# Patient Record
Sex: FEMALE | Race: WHITE | Employment: STUDENT | URBAN - METROPOLITAN AREA
[De-identification: names, ages, dates, MRNs, and addresses within clinical notes are randomized per-mention and may not be internally consistent; named-entity substitution may affect disease eponyms.]

---
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
## 2019-12-19 ENCOUNTER — HOSPITAL ENCOUNTER (EMERGENCY)
Facility: CLINIC | Age: 19
Discharge: HOME | End: 2019-12-20
Attending: EMERGENCY MEDICINE | Admitting: EMERGENCY MEDICINE
Payer: COMMERCIAL

## 2019-12-19 VITALS
TEMPERATURE: 97.3 F | SYSTOLIC BLOOD PRESSURE: 130 MMHG | HEART RATE: 93 BPM | DIASTOLIC BLOOD PRESSURE: 85 MMHG | OXYGEN SATURATION: 100 % | RESPIRATION RATE: 16 BRPM

## 2019-12-19 DIAGNOSIS — T30.0 BURN, FIRST DEGREE: ICD-10-CM

## 2019-12-19 PROCEDURE — 16020 DRESS/DEBRID P-THICK BURN S: CPT

## 2019-12-19 PROCEDURE — 90471 IMMUNIZATION ADMIN: CPT

## 2019-12-19 PROCEDURE — 99283 EMERGENCY DEPT VISIT LOW MDM: CPT | Mod: 25

## 2019-12-19 RX ORDER — LIDOCAINE HYDROCHLORIDE 20 MG/ML
10 SOLUTION OROPHARYNGEAL ONCE
Status: COMPLETED | OUTPATIENT
Start: 2019-12-20 | End: 2019-12-20

## 2019-12-19 NOTE — ED AVS SNAPSHOT
[facility] Emergency Department  [address]  [facility] [address]  Phone:  [phone]  Fax:  [phone]                                    Dorina Sánchez   MRN: [id number]    Department:  [facility] Emergency Department   Date of Visit:  12/19/2019           After Visit Summary Signature Page    I have received my discharge instructions, and my questions have been answered. I have discussed any challenges I see with this plan with the nurse or doctor.    ..........................................................................................................................................  Patient/Patient Representative Signature      ..........................................................................................................................................  Patient Representative Print Name and Relationship to Patient    ..................................................               ................................................  Date                                   Time    ..........................................................................................................................................  Reviewed by Signature/Title    ...................................................              ..............................................  Date                                               Time          22EPIC Rev 08/18

## 2019-12-20 PROCEDURE — 25000125 ZZHC RX 250: Performed by: EMERGENCY MEDICINE

## 2019-12-20 PROCEDURE — 25000128 H RX IP 250 OP 636: Performed by: EMERGENCY MEDICINE

## 2019-12-20 PROCEDURE — 90471 IMMUNIZATION ADMIN: CPT

## 2019-12-20 PROCEDURE — 90715 TDAP VACCINE 7 YRS/> IM: CPT | Performed by: EMERGENCY MEDICINE

## 2019-12-20 RX ORDER — SILVER SULFADIAZINE 10 MG/G
CREAM TOPICAL ONCE
Status: COMPLETED | OUTPATIENT
Start: 2019-12-20 | End: 2019-12-20

## 2019-12-20 RX ADMIN — SILVER SULFADIAZINE: 10 CREAM TOPICAL at 01:03

## 2019-12-20 RX ADMIN — CLOSTRIDIUM TETANI TOXOID ANTIGEN (FORMALDEHYDE INACTIVATED), CORYNEBACTERIUM DIPHTHERIAE TOXOID ANTIGEN (FORMALDEHYDE INACTIVATED), BORDETELLA PERTUSSIS TOXOID ANTIGEN (GLUTARALDEHYDE INACTIVATED), BORDETELLA PERTUSSIS FILAMENTOUS HEMAGGLUTININ ANTIGEN (FORMALDEHYDE INACTIVATED), BORDETELLA PERTUSSIS PERTACTIN ANTIGEN, AND BORDETELLA PERTUSSIS FIMBRIAE 2/3 ANTIGEN 0.5 ML: 5; 2; 2.5; 5; 3; 5 INJECTION, SUSPENSION INTRAMUSCULAR at 00:06

## 2019-12-20 RX ADMIN — LIDOCAINE HYDROCHLORIDE 10 ML: 20 SOLUTION ORAL; TOPICAL at 00:05

## 2019-12-20 NOTE — ED PROVIDER NOTES
History     Chief Complaint:  Burn      HPI   Dorina Sánchez is a righthanded 19 year old female who presents with burn. The patient notes that she had been using eyebrow wax around an hour ago and that when she opened it it spilled over on her right arm and wrist. She reports that she had ibuprofen around an hour ago as well. They were concerned about infection as they are leaving for [place] tomorrow.    Allergies:  No known drug allergies     Medications:    Prednisone  Lidocaine 2 % gel  Aldactone  Nexplanon  Prozac    Past Medical History:    Anxiety   Factor 5 Leiden mutation  Ulcer Genital Female    Past Surgical History:    Tonsillectomy    Family History:    Factor 5 Leiden mutation    Social History:  Smoking status: Never smoker  Alcohol use: Yes, 2-3 times a week  Drug use: No  PCP: Tye Mendoza  Presents to the ED with father  Up to date on immunization, not up to date with tetanus  Marital Status:  Single [1]       Review of Systems   Skin:        Burn and redness to right wrist   All other systems reviewed and are negative.        Physical Exam   First Vitals:  BP: 130/85  Pulse: 93  Temp: 97.3  F (36.3  C)  Resp: 16  SpO2: 100 %      Physical Exam  General:  No respiratory distress    Cardiovascular: Good cap refill.    Respiratory: Breathing non labored.     Musculoskeletal: No tenderness. No bony deformity.     Skin: No rashes or petechiae. Redness and blistering to right wrist and forearm with some wax.    Neurologic: non focal     Psychiatric: Appropriate.       Emergency Department Course     Procedures    Interventions:  0005 Lidocaine 2 %, 10 mL  0006 Tdap, 0.5 mL, IM     Emergency Department Course:   Nursing notes and vitals reviewed.    2346 I performed an exam of the patient as documented above.     0055 I personally reviewed the results with the patient and answered all related questions prior to discharge.    Impression & Plan      Medical Decision Making:  Dorina Sánchez is a  19 year old female who presents to the emergency department today for evaluation of a burn from wax to her right arm.  The patient reports that she had heated the wax which is used to remove hair from her eyebrows 5 times more than usual.  When she pulled the lid off and burned on her right hand prior to arrival.  There is a partial-thickness burn with some blistering most of the blisters have already popped however.  I did find some wax still present and attempted to remove this after placing viscous lidocaine however she could not tolerate it.  Instead a Silvadene dressing was applied I discussed the need to watch for signs of infection.  Her tetanus was brought up-to-date and she was discharged home in good condition.    Diagnosis:    ICD-10-CM    1. Burn, first degree T30.0        Disposition:   The patient is discharged to home.       Scribe Disclosure:  I, Wendi Hollins, am serving as a scribe at 2346 on 12/19/2019 to document services personally performed by Lowell Restrepo MD based on my observations and the provider's statements to me.   [facility] EMERGENCY DEPARTMENT       Lowell Restrepo MD  12/20/19 0153

## 2019-12-20 NOTE — ED TRIAGE NOTES
Pt in with C/O R arm burn. Pt reports she was heating up wax and had trouble getting the lid off and burned her R FA. Redness and blistering present. Pt A&Ox4 ABCD's intact